# Patient Record
(demographics unavailable — no encounter records)

---

## 2025-03-28 NOTE — HISTORY OF PRESENT ILLNESS
[10] : 10 [9] : 9 [Radiating] : radiating [Constant] : constant [Nothing helps with pain getting better] : Nothing helps with pain getting better [Sitting] : sitting [Walking] : walking [Retired] : Work status: retired [de-identified] : 03/26/2025: 63 Y F presenting today for an initial evaluation. C/o lower back pains. Onset 3/8/25, after lifting furniture, pt reports immediate back pains that day which were mild. Worsening pains the following day. R sided back pain radiating to R hip and groin.  PMHx: Breast cancer (2021), BL mastectomy, 5-year chemotherapy. Pt on Prolia as a preventive measure, bone health is well.  PSHx: Gallbladder.  [] : no [FreeTextEntry7] : right hip

## 2025-03-28 NOTE — PHYSICAL EXAM
[Flexion] : flexion [Extension] : extension [Bending to left] : bending to left [Bending to right] : bending to right [de-identified] : Constitutional: - General Appearance: Unremarkable Body Habitus Well Developed Well Nourished Body Habitus No Deformities Well Groomed Ability To communicate: Normal Neurologic: Global sensation is intact to upper and lower extremities. See examination of Neck and/or Spine for exceptions. Orientation to Time, Place and Person is: Normal Mood And Affect is Normal Skin: - Head/Face, Right Upper/Lower Extremity, Left Upper/Lower Extremity: Normal See Examination of Neck and/or Spine for exceptions Cardiovascular: Peripheral Cardiovascular System is Normal Palpation of Lymph Nodes: Normal Palpation of lymph nodes in: Axilla, Cervical, Inguinal Abnormal Palpation of lymph nodes in: None  [] : non-antalgic [FreeTextEntry8] : L4-S1 BL tenderness in paraspinals. tip of pelvis tenderness.  [de-identified] : left lateral bending 10 degrees [de-identified] : right lateral bending 10 degrees

## 2025-03-28 NOTE — DATA REVIEWED
[FreeTextEntry1] : On my interpretations of these images from [facility] in [place] on 03/26/2025: I have independently reviewed and interpreted these images. 2 V hip and pelvis RT-NORMAL, surgical clips in abdomen.   On my interpretations of these images from [facility] on 03/26/2025: I have independently reviewed and interpreted these images. 2 V lumbar XR- mod DDD and facet degeneration L1-S1 no scoliosis. L3 possible compression FX.

## 2025-03-28 NOTE — DATA REVIEWED
[FreeTextEntry1] : On my interpretations of these images from [facility] in [place] on 03/26/2025: I have independently reviewed and interpreted these images. 2 V hip and pelvis RT-NORMAL, surgical clips in abdomen.   On my interpretations of these images from [facility] on 03/26/2025: I have independently reviewed and interpreted these images. 2 V lumbar XR- mod DDD and facet degeneration L1-S1 no scoliosis. L3 possible compression FX.

## 2025-03-28 NOTE — DISCUSSION/SUMMARY
[de-identified] : 63 Y F w/ lumbar spondylosis, back pains s/p lifting injury, potential compression FX of L3, I am requesting a STAT lumbar MRI to eval for compression FX and properly update treatment plan. Pt is claustrophobic.  In house XRs reviewed & discussed with patient today.  Patient was educated and informed on their condition along with the expected outcomes. Tramadol RX'd to aid in symptom control, advised to take very sparingly.   Once MRI is completed, will discuss results via telephone.   Prior to appointment and during encounter with patient extensive medical records were reviewed including but not limited to, hospital records, out patient records, imaging results, and lab data. During this appointment the patient was examined, diagnoses were discussed and explained in a face to face manner. In addition extensive time was spent reviewing aforementioned diagnostic studies. Counseling including abnormal image results, differential diagnoses, treatment options, risk and benefits, lifestyle changes, current condition, and current medications was performed. Patient's comments, questions, and concerns were address and patient verbalized understanding. Based on this patient's presentation at our office, which is an orthopedic spine surgeon's office, this patient inherently / intrinsically has a risk, however minute, of developing issues such as Cauda equina syndrome, bowel and bladder changes, or progression of motor or neurological deficits such as paralysis which may be permanent.    I, Nay Ramon, attest that this documentation has been prepared under the direction and in the presence of provider Rasheed Rod MD.

## 2025-03-28 NOTE — DISCUSSION/SUMMARY
[de-identified] : 63 Y F w/ lumbar spondylosis, back pains s/p lifting injury, potential compression FX of L3, I am requesting a STAT lumbar MRI to eval for compression FX and properly update treatment plan. Pt is claustrophobic.  In house XRs reviewed & discussed with patient today.  Patient was educated and informed on their condition along with the expected outcomes. Tramadol RX'd to aid in symptom control, advised to take very sparingly.   Once MRI is completed, will discuss results via telephone.   Prior to appointment and during encounter with patient extensive medical records were reviewed including but not limited to, hospital records, out patient records, imaging results, and lab data. During this appointment the patient was examined, diagnoses were discussed and explained in a face to face manner. In addition extensive time was spent reviewing aforementioned diagnostic studies. Counseling including abnormal image results, differential diagnoses, treatment options, risk and benefits, lifestyle changes, current condition, and current medications was performed. Patient's comments, questions, and concerns were address and patient verbalized understanding. Based on this patient's presentation at our office, which is an orthopedic spine surgeon's office, this patient inherently / intrinsically has a risk, however minute, of developing issues such as Cauda equina syndrome, bowel and bladder changes, or progression of motor or neurological deficits such as paralysis which may be permanent.    I, Nay Ramon, attest that this documentation has been prepared under the direction and in the presence of provider Rasheed Rod MD.

## 2025-03-28 NOTE — HISTORY OF PRESENT ILLNESS
[10] : 10 [9] : 9 [Radiating] : radiating [Constant] : constant [Nothing helps with pain getting better] : Nothing helps with pain getting better [Sitting] : sitting [Walking] : walking [Retired] : Work status: retired [de-identified] : 03/26/2025: 63 Y F presenting today for an initial evaluation. C/o lower back pains. Onset 3/8/25, after lifting furniture, pt reports immediate back pains that day which were mild. Worsening pains the following day. R sided back pain radiating to R hip and groin.  PMHx: Breast cancer (2021), BL mastectomy, 5-year chemotherapy. Pt on Prolia as a preventive measure, bone health is well.  PSHx: Gallbladder.  [] : no [FreeTextEntry7] : right hip

## 2025-03-28 NOTE — PHYSICAL EXAM
[Flexion] : flexion [Extension] : extension [Bending to left] : bending to left [Bending to right] : bending to right [de-identified] : Constitutional: - General Appearance: Unremarkable Body Habitus Well Developed Well Nourished Body Habitus No Deformities Well Groomed Ability To communicate: Normal Neurologic: Global sensation is intact to upper and lower extremities. See examination of Neck and/or Spine for exceptions. Orientation to Time, Place and Person is: Normal Mood And Affect is Normal Skin: - Head/Face, Right Upper/Lower Extremity, Left Upper/Lower Extremity: Normal See Examination of Neck and/or Spine for exceptions Cardiovascular: Peripheral Cardiovascular System is Normal Palpation of Lymph Nodes: Normal Palpation of lymph nodes in: Axilla, Cervical, Inguinal Abnormal Palpation of lymph nodes in: None  [] : non-antalgic [FreeTextEntry8] : L4-S1 BL tenderness in paraspinals. tip of pelvis tenderness.  [de-identified] : left lateral bending 10 degrees [de-identified] : right lateral bending 10 degrees

## 2025-05-11 NOTE — DATA REVIEWED
[FreeTextEntry1] : On my interpretation of these images from [facility] 03/27/25.  I have additionally reviewed the radiologist report. MRI- L2 & L3 edema of superior endplate and schmorl's node, consistent w/ subacute compression FX VS sub-acute schmorl's node.  L5-S1: mild disc and facet degeneration, no stenosis.  L4-5: mod disc, mod facet mild stenosis L3-4: mod-adv disc, mild-mod facet, mild stenosis.  L2-3: adv disc, mild stenosis.  L1-2: adv disc, no stenosis.  T12-L1:    On my interpretations of these images from [facility] in [place] on 03/26/2025: I have independently reviewed and interpreted these images. 2 V hip and pelvis RT-NORMAL, surgical clips in abdomen.   On my interpretations of these images from [facility] on 03/26/2025: I have independently reviewed and interpreted these images. 2 V lumbar XR- mod DDD and facet degeneration L1-S1 no scoliosis. L3 possible compression FX.

## 2025-05-11 NOTE — HISTORY OF PRESENT ILLNESS
[5] : 5 [6] : 6 [Retired] : Work status: retired [de-identified] : 05/07/2025: Patient presenting today for an MRI results review. At this time pain level is a 5/10. Relief in symptoms with warm weather. 6/10 QHS, if not ambulating with a pillow. Nausea side effect w/ pain medication. Intermittent use of OTC Tylenol. Relief with OTC topical gel from [place].   03/26/2025: 63 Y F presenting today for an initial evaluation. C/o lower back pains. Onset 3/8/25, after lifting furniture, pt reports immediate back pains that day which were mild. Worsening pains the following day. R sided back pain radiating to R hip and groin.  PMHx: Breast cancer (2021), BL mastectomy, 5-year chemotherapy. Pt on Prolia as a preventive measure, bone health is well.  PSHx: Gallbladder.

## 2025-05-11 NOTE — DISCUSSION/SUMMARY
[de-identified] : 63 Y F w/ lumbar spondylosis, L2 & L3 edema of superior endplate/fracture VS schmorl's node.  Once again, [facility] reviewed & discussed with patient today.  Patient was educated and informed on their condition along with the expected outcomes.  Discussed non-operative (NSAIDs, bracing, ice/heat, rest) and operative treatment measures (kyphoplasty) of compression deformities.  Best to manage w/ non-operative care (NSAIDs & topical gels).    F/U in 6-8 weeks. XR next visit.   Prior to appointment and during encounter with patient extensive medical records were reviewed including but not limited to, hospital records, out patient records, imaging results, and lab data. During this appointment the patient was examined, diagnoses were discussed and explained in a face to face manner. In addition extensive time was spent reviewing aforementioned diagnostic studies. Counseling including abnormal image results, differential diagnoses, treatment options, risk and benefits, lifestyle changes, current condition, and current medications was performed. Patient's comments, questions, and concerns were address and patient verbalized understanding. Based on this patient's presentation at our office, which is an orthopedic spine surgeon's office, this patient inherently / intrinsically has a risk, however minute, of developing issues such as Cauda equina syndrome, bowel and bladder changes, or progression of motor or neurological deficits such as paralysis which may be permanent.    I, Nay Ramon, attest that this documentation has been prepared under the direction and in the presence of provider Rasheed Rod MD.

## 2025-06-25 NOTE — PHYSICAL EXAM
[de-identified] : Constitutional: - General Appearance: Unremarkable Body Habitus Well Developed Well Nourished Body Habitus No Deformities Well Groomed Ability To communicate: Normal Neurologic: Global sensation is intact to upper and lower extremities. See examination of Neck and/or Spine for exceptions. Orientation to Time, Place and Person is: Normal Mood And Affect is Normal Skin: - Head/Face, Right Upper/Lower Extremity, Left Upper/Lower Extremity: Normal See Examination of Neck and/or Spine for exceptions Cardiovascular: Peripheral Cardiovascular System is Normal Palpation of Lymph Nodes: Normal Palpation of lymph nodes in: Axilla, Cervical, Inguinal Abnormal Palpation of lymph nodes in: None  [] : non-antalgic [de-identified] : extension 10 degrees

## 2025-06-25 NOTE — HISTORY OF PRESENT ILLNESS
[5] : 5 [0] : 0 [Retired] : Work status: retired [de-identified] : 06/25/2025: Patient presenting today for a FUV. She reports feeling better. Higher back pains improved. C/o BL lower buttock pains. 50% resolution in pains since initial episode.   05/07/2025: Patient presenting today for an MRI results review. At this time pain level is a 5/10. Relief in symptoms with warm weather. 6/10 QHS, if not ambulating with a pillow. Nausea side effect w/ pain medication. Intermittent use of OTC Tylenol. Relief with OTC topical gel from [place].   03/26/2025: 63 Y F presenting today for an initial evaluation. C/o lower back pains. Onset 3/8/25, after lifting furniture, pt reports immediate back pains that day which were mild. Worsening pains the following day. R sided back pain radiating to R hip and groin.  PMHx: Breast cancer (2021), BL mastectomy, 5-year chemotherapy. Pt on Prolia as a preventive measure, bone health is well.  PSHx: Gallbladder.    [de-identified] : no

## 2025-06-25 NOTE — DATA REVIEWED
[FreeTextEntry1] : On my interpretations of these images from [facility] on 06/25/2025: I have independently reviewed and interpreted these images. 2 V lumbar XR- superior end plate deformity L2, stable.  On my interpretation of these images from [facility] 03/27/25.  I have additionally reviewed the radiologist report. MRI- L2 & L3 edema of superior endplate and schmorl's node, consistent w/ subacute compression FX VS sub-acute schmorl's node.  L5-S1: mild disc and facet degeneration, no stenosis.  L4-5: mod disc, mod facet mild stenosis L3-4: mod-adv disc, mild-mod facet, mild stenosis.  L2-3: adv disc, mild stenosis.  L1-2: adv disc, no stenosis.  T12-L1:    On my interpretations of these images from [facility] on 03/26/2025: I have independently reviewed and interpreted these images. 2 V hip and pelvis RT-NORMAL, surgical clips in abdomen.   On my interpretations of these images from [facility] on 03/26/2025: I have independently reviewed and interpreted these images. 2 V lumbar XR- mod DDD and facet degeneration L1-S1 no scoliosis. L3 possible compression FX.

## 2025-06-25 NOTE — DISCUSSION/SUMMARY
[de-identified] : 64 Y F w/ L2 superior endplate compression fx VS acute schmorl's node. Stable. 50% reduction in pains.  Patient was educated and informed on their condition along with the expected outcomes. Best to manage w/ non-operative care (NSAIDs & topical gels).   Patient was provided with a referral for lumbar physical therapy to work on stretching, strengthening and range of motion. Patient was provided with a lumbar home exercise program. She may increase her activity level slowly.   F/U in 2 months.    Prior to appointment and during encounter with patient extensive medical records were reviewed including but not limited to, hospital records, out patient records, imaging results, and lab data. During this appointment the patient was examined, diagnoses were discussed and explained in a face to face manner. In addition extensive time was spent reviewing aforementioned diagnostic studies. Counseling including abnormal image results, differential diagnoses, treatment options, risk and benefits, lifestyle changes, current condition, and current medications was performed. Patient's comments, questions, and concerns were address and patient verbalized understanding. Based on this patient's presentation at our office, which is an orthopedic spine surgeon's office, this patient inherently / intrinsically has a risk, however minute, of developing issues such as Cauda equina syndrome, bowel and bladder changes, or progression of motor or neurological deficits such as paralysis which may be permanent.    I, Nay Ramon, attest that this documentation has been prepared under the direction and in the presence of provider Rasheed Rod MD.

## 2025-07-24 NOTE — HISTORY OF PRESENT ILLNESS
[10] : 10 [8] : 8 [Localized] : localized [Sharp] : sharp [Throbbing] : throbbing [Injection therapy] : injection therapy [Retired] : Work status: retired [de-identified] : 63 year old female followed for b/l [place]. L> R. With recurrence.   RT [place]: 5/09/24, 10/14/24 LT [place]: 10/14/ 24 [] : no [FreeTextEntry1] : scott wrist/hand LT worse than RT  [FreeTextEntry5] : NKI, has been treated in the past with a CSI which helped for a long period  [FreeTextEntry9] : brace, CSI  [de-identified] : lifting, twisting, using the hand  [de-identified] : ~ 2 years ago  [de-identified] : Another Orthopedic- brace and CSI  [de-identified] : Left CSI 10/24- 85% better- pain returned a few months ago  Right CSI 10/24- 95% better
